# Patient Record
Sex: MALE | Race: OTHER | Employment: UNEMPLOYED | URBAN - METROPOLITAN AREA
[De-identification: names, ages, dates, MRNs, and addresses within clinical notes are randomized per-mention and may not be internally consistent; named-entity substitution may affect disease eponyms.]

---

## 2019-08-14 ENCOUNTER — OFFICE VISIT (OUTPATIENT)
Dept: FAMILY MEDICINE CLINIC | Age: 29
End: 2019-08-14

## 2019-08-14 ENCOUNTER — HOSPITAL ENCOUNTER (OUTPATIENT)
Age: 29
Discharge: HOME | End: 2019-08-16

## 2019-08-14 VITALS
HEART RATE: 89 BPM | OXYGEN SATURATION: 97 % | BODY MASS INDEX: 26.82 KG/M2 | HEIGHT: 65 IN | RESPIRATION RATE: 18 BRPM | TEMPERATURE: 98.5 F | DIASTOLIC BLOOD PRESSURE: 77 MMHG | SYSTOLIC BLOOD PRESSURE: 122 MMHG | WEIGHT: 161 LBS

## 2019-08-14 DIAGNOSIS — G62.9 NEUROPATHY: ICD-10-CM

## 2019-08-14 DIAGNOSIS — Z12.11 SCREEN FOR COLON CANCER: ICD-10-CM

## 2019-08-14 DIAGNOSIS — E11.9 TYPE 2 DIABETES MELLITUS WITHOUT COMPLICATION, WITHOUT LONG-TERM CURRENT USE OF INSULIN (HCC): Primary | ICD-10-CM

## 2019-08-14 DIAGNOSIS — F10.10 ALCOHOL ABUSE: ICD-10-CM

## 2019-08-14 LAB
ALBUMIN SERPL-MCNC: 5.2 G/DL (ref 3.5–5.2)
ALP BLD-CCNC: 130 U/L (ref 40–129)
ALT SERPL-CCNC: 25 U/L (ref 0–40)
ANION GAP SERPL CALCULATED.3IONS-SCNC: 17 MMOL/L (ref 7–16)
AST SERPL-CCNC: 17 U/L (ref 0–39)
BILIRUB SERPL-MCNC: 0.6 MG/DL (ref 0–1.2)
BUN BLDV-MCNC: 14 MG/DL (ref 6–20)
CALCIUM SERPL-MCNC: 10.3 MG/DL (ref 8.6–10.2)
CHLORIDE BLD-SCNC: 99 MMOL/L (ref 98–107)
CHOLESTEROL, TOTAL: 189 MG/DL (ref 0–199)
CO2: 25 MMOL/L (ref 22–29)
CREAT SERPL-MCNC: 0.7 MG/DL (ref 0.7–1.2)
GFR AFRICAN AMERICAN: >60
GFR NON-AFRICAN AMERICAN: >60 ML/MIN/1.73
GLUCOSE BLD-MCNC: 304 MG/DL (ref 74–99)
HBA1C MFR BLD: 14 %
HDLC SERPL-MCNC: 40 MG/DL
LDL CHOLESTEROL CALCULATED: 113 MG/DL (ref 0–99)
POTASSIUM SERPL-SCNC: 4.3 MMOL/L (ref 3.5–5)
SODIUM BLD-SCNC: 141 MMOL/L (ref 132–146)
TOTAL PROTEIN: 8.3 G/DL (ref 6.4–8.3)
TRIGL SERPL-MCNC: 182 MG/DL (ref 0–149)
TSH SERPL DL<=0.05 MIU/L-ACNC: 1.22 UIU/ML (ref 0.27–4.2)
VLDLC SERPL CALC-MCNC: 36 MG/DL

## 2019-08-14 PROCEDURE — 80053 COMPREHEN METABOLIC PANEL: CPT

## 2019-08-14 PROCEDURE — 80061 LIPID PANEL: CPT

## 2019-08-14 PROCEDURE — 36415 COLL VENOUS BLD VENIPUNCTURE: CPT

## 2019-08-14 PROCEDURE — 36415 COLL VENOUS BLD VENIPUNCTURE: CPT | Performed by: FAMILY MEDICINE

## 2019-08-14 PROCEDURE — 99203 OFFICE O/P NEW LOW 30 MIN: CPT | Performed by: STUDENT IN AN ORGANIZED HEALTH CARE EDUCATION/TRAINING PROGRAM

## 2019-08-14 PROCEDURE — 83036 HEMOGLOBIN GLYCOSYLATED A1C: CPT | Performed by: STUDENT IN AN ORGANIZED HEALTH CARE EDUCATION/TRAINING PROGRAM

## 2019-08-14 PROCEDURE — 84425 ASSAY OF VITAMIN B-1: CPT

## 2019-08-14 PROCEDURE — 84443 ASSAY THYROID STIM HORMONE: CPT

## 2019-08-14 PROCEDURE — 86703 HIV-1/HIV-2 1 RESULT ANTBDY: CPT

## 2019-08-14 ASSESSMENT — PATIENT HEALTH QUESTIONNAIRE - PHQ9
SUM OF ALL RESPONSES TO PHQ9 QUESTIONS 1 & 2: 0
2. FEELING DOWN, DEPRESSED OR HOPELESS: 0
1. LITTLE INTEREST OR PLEASURE IN DOING THINGS: 0
SUM OF ALL RESPONSES TO PHQ QUESTIONS 1-9: 0
SUM OF ALL RESPONSES TO PHQ QUESTIONS 1-9: 0

## 2019-08-14 NOTE — PROGRESS NOTES
tjo     2019    Edson Evans (:  1990) is a 34 y.o. male, here for evaluation of the following medical concerns:    HPI   Patient recently moved from [facility] to [facility] for work. He works in the kitchen at [employer]. He moved here on his own. He does have some cousins nearby. As far as he knows he has a recent diagnosis of diabetes. Reports he was only on oral medications. Denies use of insulin. Family history significant for extensive diabetes. Endorses polydipsia and bilateral neuropathy. On further gathering a social history he drinks about 5 beers every day. Has noticed some yellowing of his eyes he thinks maybe this is getting worse. Denies nausea vomiting abdominal distention bright red blood per rectum or melena. Review of Systems   Constitutional: Negative for activity change, fatigue, fever and unexpected weight change. HENT: Negative for congestion, rhinorrhea and tinnitus. Yellowing of eyes   Eyes: Negative for pain, itching and visual disturbance. Respiratory: Negative for cough, chest tightness, shortness of breath and wheezing. Cardiovascular: Negative for chest pain, palpitations and leg swelling. Gastrointestinal: Negative for abdominal distention, abdominal pain, blood in stool, constipation, diarrhea, nausea and vomiting. Endocrine: Positive for polydipsia. Negative for cold intolerance, heat intolerance and polyuria. Genitourinary: Negative for frequency, hematuria and urgency. Musculoskeletal: Negative for arthralgias, back pain, gait problem and myalgias. Skin: Negative for color change, pallor, rash and wound. Neurological: Positive for numbness. Negative for dizziness, weakness, light-headedness and headaches. Psychiatric/Behavioral: Negative for agitation and suicidal ideas. The patient is not nervous/anxious.         Prior to Visit Medications    Not on File        No Known Allergies    Past Medical History:

## 2019-08-15 PROBLEM — E11.9 TYPE 2 DIABETES MELLITUS WITHOUT COMPLICATION, WITHOUT LONG-TERM CURRENT USE OF INSULIN (HCC): Status: ACTIVE | Noted: 2019-08-15

## 2019-08-15 PROBLEM — F10.10 ALCOHOL ABUSE: Status: ACTIVE | Noted: 2019-08-15

## 2019-08-15 LAB — HIV-1 AND HIV-2 ANTIBODIES: NORMAL

## 2019-08-15 ASSESSMENT — ENCOUNTER SYMPTOMS
CONSTIPATION: 0
WHEEZING: 0
NAUSEA: 0
ABDOMINAL PAIN: 0
EYE PAIN: 0
VOMITING: 0
SHORTNESS OF BREATH: 0
EYE ITCHING: 0
RHINORRHEA: 0
DIARRHEA: 0
ABDOMINAL DISTENTION: 0
BACK PAIN: 0
COUGH: 0
CHEST TIGHTNESS: 0
BLOOD IN STOOL: 0
COLOR CHANGE: 0

## 2019-08-19 LAB — VITAMIN B1 WHOLE BLOOD: 137 NMOL/L (ref 70–180)

## 2019-08-22 ENCOUNTER — HOSPITAL ENCOUNTER (OUTPATIENT)
Age: 29
Discharge: HOME | End: 2019-08-24

## 2019-08-22 ENCOUNTER — OFFICE VISIT (OUTPATIENT)
Dept: FAMILY MEDICINE CLINIC | Age: 29
End: 2019-08-22

## 2019-08-22 VITALS
BODY MASS INDEX: 26.33 KG/M2 | RESPIRATION RATE: 12 BRPM | WEIGHT: 158 LBS | TEMPERATURE: 98.2 F | DIASTOLIC BLOOD PRESSURE: 88 MMHG | OXYGEN SATURATION: 98 % | SYSTOLIC BLOOD PRESSURE: 135 MMHG | HEIGHT: 65 IN | HEART RATE: 70 BPM

## 2019-08-22 DIAGNOSIS — B37.42 CANDIDAL BALANITIS: ICD-10-CM

## 2019-08-22 DIAGNOSIS — E11.9 TYPE 2 DIABETES MELLITUS WITHOUT COMPLICATION, WITHOUT LONG-TERM CURRENT USE OF INSULIN (HCC): Primary | ICD-10-CM

## 2019-08-22 DIAGNOSIS — E11.9 TYPE 2 DIABETES MELLITUS WITHOUT COMPLICATION, WITHOUT LONG-TERM CURRENT USE OF INSULIN (HCC): ICD-10-CM

## 2019-08-22 LAB
BASOPHILS ABSOLUTE: 0.03 E9/L (ref 0–0.2)
BASOPHILS RELATIVE PERCENT: 0.4 % (ref 0–2)
CREATININE URINE: 21 MG/DL (ref 40–278)
EOSINOPHILS ABSOLUTE: 0.15 E9/L (ref 0.05–0.5)
EOSINOPHILS RELATIVE PERCENT: 2.1 % (ref 0–6)
HCT VFR BLD CALC: 43.6 % (ref 37–54)
HEMOGLOBIN: 15.6 G/DL (ref 12.5–16.5)
IMMATURE GRANULOCYTES #: 0.02 E9/L
IMMATURE GRANULOCYTES %: 0.3 % (ref 0–5)
LYMPHOCYTES ABSOLUTE: 2.46 E9/L (ref 1.5–4)
LYMPHOCYTES RELATIVE PERCENT: 34.5 % (ref 20–42)
MCH RBC QN AUTO: 31.6 PG (ref 26–35)
MCHC RBC AUTO-ENTMCNC: 35.8 % (ref 32–34.5)
MCV RBC AUTO: 88.3 FL (ref 80–99.9)
MICROALBUMIN UR-MCNC: <12 MG/L
MICROALBUMIN/CREAT UR-RTO: ABNORMAL (ref 0–30)
MONOCYTES ABSOLUTE: 0.47 E9/L (ref 0.1–0.95)
MONOCYTES RELATIVE PERCENT: 6.6 % (ref 2–12)
NEUTROPHILS ABSOLUTE: 4 E9/L (ref 1.8–7.3)
NEUTROPHILS RELATIVE PERCENT: 56.1 % (ref 43–80)
PDW BLD-RTO: 11.6 FL (ref 11.5–15)
PLATELET # BLD: 195 E9/L (ref 130–450)
PMV BLD AUTO: 13.9 FL (ref 7–12)
RBC # BLD: 4.94 E12/L (ref 3.8–5.8)
SEDIMENTATION RATE, ERYTHROCYTE: 3 MM/HR (ref 0–15)
WBC # BLD: 7.1 E9/L (ref 4.5–11.5)

## 2019-08-22 PROCEDURE — 99212 OFFICE O/P EST SF 10 MIN: CPT | Performed by: STUDENT IN AN ORGANIZED HEALTH CARE EDUCATION/TRAINING PROGRAM

## 2019-08-22 PROCEDURE — 85025 COMPLETE CBC W/AUTO DIFF WBC: CPT

## 2019-08-22 PROCEDURE — 82570 ASSAY OF URINE CREATININE: CPT

## 2019-08-22 PROCEDURE — 99213 OFFICE O/P EST LOW 20 MIN: CPT | Performed by: STUDENT IN AN ORGANIZED HEALTH CARE EDUCATION/TRAINING PROGRAM

## 2019-08-22 PROCEDURE — 82044 UR ALBUMIN SEMIQUANTITATIVE: CPT

## 2019-08-22 PROCEDURE — 36415 COLL VENOUS BLD VENIPUNCTURE: CPT | Performed by: FAMILY MEDICINE

## 2019-08-22 PROCEDURE — 85651 RBC SED RATE NONAUTOMATED: CPT

## 2019-08-22 RX ORDER — CLOTRIMAZOLE 1 %
CREAM (GRAM) TOPICAL
Qty: 14 G | Refills: 1 | Status: SHIPPED | OUTPATIENT
Start: 2019-08-22 | End: 2019-08-29

## 2019-08-22 RX ORDER — METFORMIN HYDROCHLORIDE 500 MG/1
1000 TABLET, EXTENDED RELEASE ORAL
Qty: 180 TABLET | Refills: 1 | Status: SHIPPED | OUTPATIENT
Start: 2019-08-22 | End: 2019-08-22

## 2019-08-22 NOTE — PROGRESS NOTES
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 Candidal balanitis  - Likely secondary to uncontrolled DM  - Educated on proper cleansing techniques  - clotrimazole (LOTRIMIN) 1 % cream; Apply topically 2 times daily. Dispense: 14 g; Refill: 1      Return in about 2 weeks (around 9/5/2019) for f/u DM. An electronic signature was used to authenticate this note.     --Senia Feliciano MD on 8/25/2019 at 5:50 PM

## 2019-08-25 ASSESSMENT — ENCOUNTER SYMPTOMS
DIARRHEA: 0
CHEST TIGHTNESS: 0
COUGH: 0
EYE PAIN: 0
BACK PAIN: 0
CONSTIPATION: 0
COLOR CHANGE: 0
ABDOMINAL PAIN: 0
EYE ITCHING: 0
SHORTNESS OF BREATH: 0
WHEEZING: 0
ABDOMINAL DISTENTION: 0
VOMITING: 0
BLOOD IN STOOL: 0
RHINORRHEA: 0
NAUSEA: 0

## 2019-09-20 ENCOUNTER — OFFICE VISIT (OUTPATIENT)
Dept: FAMILY MEDICINE CLINIC | Age: 29
End: 2019-09-20

## 2019-09-20 VITALS
BODY MASS INDEX: 28.16 KG/M2 | WEIGHT: 169 LBS | HEIGHT: 65 IN | SYSTOLIC BLOOD PRESSURE: 126 MMHG | RESPIRATION RATE: 16 BRPM | HEART RATE: 77 BPM | TEMPERATURE: 98.5 F | OXYGEN SATURATION: 98 % | DIASTOLIC BLOOD PRESSURE: 86 MMHG

## 2019-09-20 DIAGNOSIS — E11.9 TYPE 2 DIABETES MELLITUS WITHOUT COMPLICATION, WITHOUT LONG-TERM CURRENT USE OF INSULIN (HCC): Primary | ICD-10-CM

## 2019-09-20 LAB
CHP ED QC CHECK: NORMAL
GLUCOSE BLD-MCNC: 258 MG/DL

## 2019-09-20 PROCEDURE — 99213 OFFICE O/P EST LOW 20 MIN: CPT | Performed by: STUDENT IN AN ORGANIZED HEALTH CARE EDUCATION/TRAINING PROGRAM

## 2019-09-20 PROCEDURE — 82962 GLUCOSE BLOOD TEST: CPT | Performed by: STUDENT IN AN ORGANIZED HEALTH CARE EDUCATION/TRAINING PROGRAM

## 2019-09-20 PROCEDURE — 99212 OFFICE O/P EST SF 10 MIN: CPT | Performed by: STUDENT IN AN ORGANIZED HEALTH CARE EDUCATION/TRAINING PROGRAM

## 2019-09-20 NOTE — PATIENT INSTRUCTIONS
comparta nimco lapicera inyectora o nimco jeringa con otra persona, aunque la aguja se haya [place]. ¿Qué es insulin isophane? Insulin es nimco hormona que funciona reduciendo los niveles de glucosa (azúcar) en la [place]. Insulin isophane es nimco insulina de acción intermedia que empieza a funcionar de 2 a 4 horas después de la inyección, alcanza tavares punto gabe en 4 a 12 horas, y sigue funcionando por 12 a 18 horas. Insulin isophane se [place] para mejorar el control de la azúcar en la [place] en los adultos y los niños con diabetes mellitus. Insulin isophane puede también usarse para fines no mencionados en esta guía del medicamento. ¿Qué debería discutir con el profesional del cuidado de la cristiane antes de usar insulin isophane? Usted no debe usar insulin isophane si es alérgico a éste, o si tiene un episodio de hipoglucemia (nivel bajo de azúcar en la [place]). No dé insulin isophane a un nathan sin el consejo médico.  Dígale a tavares médico si alguna vez ha tenido:  · enfermedad del hígado o riñón; o  · niveles bajos de potasio en tavares [place] (hipopotasemia). Dígale a tavares médico si usted también está tomando pioglitazone o rosiglitazone (a veces se encuentran en medicinas combinadas junto con glimepiride o metformin). Mio ciertos medicamentos orales para la diabetes mientras está usando insulina puede aumentar tavares riesgo de problemas serios del corazón. Siga las instrucciones de tavares médico acerca del uso de insulina si usted está embarazada o amamantando a un bebé. Controlar el nivel de azúcar en la [place] es muy importante edy el [place], y tavares dosis puede ser Martha Simpers trimestre del [place]. La dosis que usted Cryptonator puede ser diferente mientras esté lactando. ¿Cómo deangelo usar insulin isophane? Siga todas las instrucciones en la etiqueta de tavares prescripción y amos todas las guías del medicamento o las hojas de instrucción. Use la medicina exactamente kellie indicado.   Insulin isophane se inyecta

## 2019-09-22 ASSESSMENT — ENCOUNTER SYMPTOMS
BACK PAIN: 0
ABDOMINAL PAIN: 0
COLOR CHANGE: 0
CHEST TIGHTNESS: 0
EYE ITCHING: 0
WHEEZING: 0
SHORTNESS OF BREATH: 0
RHINORRHEA: 0
EYE PAIN: 0
BLOOD IN STOOL: 0
CONSTIPATION: 0
DIARRHEA: 0
VOMITING: 0
ABDOMINAL DISTENTION: 0
NAUSEA: 0
COUGH: 0